# Patient Record
Sex: FEMALE | Race: WHITE | Employment: UNEMPLOYED | URBAN - METROPOLITAN AREA
[De-identification: names, ages, dates, MRNs, and addresses within clinical notes are randomized per-mention and may not be internally consistent; named-entity substitution may affect disease eponyms.]

---

## 2017-09-26 ENCOUNTER — HOSPITAL ENCOUNTER (OUTPATIENT)
Dept: ULTRASOUND IMAGING | Facility: HOSPITAL | Age: 42
Discharge: HOME | End: 2017-09-26
Attending: FAMILY MEDICINE
Payer: COMMERCIAL

## 2017-09-26 DIAGNOSIS — M79.605 PAIN IN BOTH LOWER EXTREMITIES: ICD-10-CM

## 2017-09-26 DIAGNOSIS — M79.604 PAIN IN BOTH LOWER EXTREMITIES: ICD-10-CM

## 2017-09-26 PROCEDURE — 93926 LOWER EXTREMITY STUDY: CPT | Performed by: FAMILY MEDICINE

## 2017-09-26 PROCEDURE — 93971 EXTREMITY STUDY: CPT | Performed by: FAMILY MEDICINE

## 2017-10-12 ENCOUNTER — HOSPITAL ENCOUNTER (OUTPATIENT)
Dept: PHYSICAL THERAPY | Facility: HOSPITAL | Age: 42
Setting detail: THERAPIES SERIES
Discharge: HOME | End: 2017-10-12
Attending: FAMILY MEDICINE
Payer: COMMERCIAL

## 2017-10-12 DIAGNOSIS — M79.604 RIGHT LEG PAIN: ICD-10-CM

## 2017-10-12 PROCEDURE — 97110 THERAPEUTIC EXERCISES: CPT

## 2017-10-12 PROCEDURE — 97162 PT EVAL MOD COMPLEX 30 MIN: CPT

## 2017-10-12 NOTE — PROGRESS NOTES
LOWER EXTREMITY EVALUATION:   Referring Physician: Dr. Dre Marino  Diagnosis: R leg pain     Date of Service: 10/12/2017     PATIENT Abby Hawk is a 38year old y/o female who presents to therapy today with complaints of pain in the R lower leg ADL as prior without pain or compensation.      Precautions:  None  OBJECTIVE:   Observation: slight discoloration of R lower leg and foot (reddish-purple coloration), increased skin temperature of R lower leg/ankle, moderate swelling throughout R foot/ankl for improved ankle control with ADLs such as prolonged gait and stair negotiation (8-10 visits)    · Increased balance/proprioception with patient able to perform SLS on Airex foam >= 20 seconds for increased ankle stability with activities such as gait on

## 2017-10-16 ENCOUNTER — HOSPITAL ENCOUNTER (OUTPATIENT)
Dept: PHYSICAL THERAPY | Facility: HOSPITAL | Age: 42
Setting detail: THERAPIES SERIES
Discharge: HOME | End: 2017-10-16
Attending: FAMILY MEDICINE
Payer: COMMERCIAL

## 2017-10-16 PROCEDURE — 97110 THERAPEUTIC EXERCISES: CPT

## 2017-10-16 PROCEDURE — 97140 MANUAL THERAPY 1/> REGIONS: CPT

## 2017-10-16 NOTE — PROGRESS NOTES
Dx: R leg pain (s/p R ORIF)         Authorized # of Visits:  8         Next MD visit: none scheduled  Fall Risk: standard         Precautions: n/a             Subjective: Patient reports the R ankle is a little more painful today as she was on her feet mor

## 2017-10-19 ENCOUNTER — HOSPITAL ENCOUNTER (OUTPATIENT)
Dept: PHYSICAL THERAPY | Facility: HOSPITAL | Age: 42
Setting detail: THERAPIES SERIES
Discharge: HOME | End: 2017-10-19
Attending: FAMILY MEDICINE
Payer: COMMERCIAL

## 2017-10-19 PROCEDURE — 97140 MANUAL THERAPY 1/> REGIONS: CPT

## 2017-10-19 PROCEDURE — 97110 THERAPEUTIC EXERCISES: CPT

## 2017-10-19 NOTE — PROGRESS NOTES
Dx: R leg pain (s/p R ORIF)         Authorized # of Visits:  8         Next MD visit: none scheduled  Fall Risk: standard         Precautions: n/a             Subjective: Patient reports the R ankle has been feeling better but she did note \"soreness\" fol

## 2017-10-23 ENCOUNTER — HOSPITAL ENCOUNTER (OUTPATIENT)
Dept: PHYSICAL THERAPY | Facility: HOSPITAL | Age: 42
Setting detail: THERAPIES SERIES
Discharge: HOME | End: 2017-10-23
Attending: FAMILY MEDICINE
Payer: COMMERCIAL

## 2017-10-23 PROCEDURE — 97110 THERAPEUTIC EXERCISES: CPT

## 2017-10-23 PROCEDURE — 97140 MANUAL THERAPY 1/> REGIONS: CPT

## 2017-10-23 NOTE — PROGRESS NOTES
Dx: R leg pain (s/p R ORIF)         Authorized # of Visits:  8         Next MD visit: none scheduled  Fall Risk: standard         Precautions: n/a             Subjective: Patient reports the right ankle is a little more swollen because she was on her feet Total Treatment Time: 45 min

## 2017-10-26 ENCOUNTER — HOSPITAL ENCOUNTER (OUTPATIENT)
Dept: PHYSICAL THERAPY | Facility: HOSPITAL | Age: 42
Setting detail: THERAPIES SERIES
Discharge: HOME | End: 2017-10-26
Attending: FAMILY MEDICINE
Payer: COMMERCIAL

## 2017-10-26 PROCEDURE — 97110 THERAPEUTIC EXERCISES: CPT

## 2017-10-26 PROCEDURE — 97140 MANUAL THERAPY 1/> REGIONS: CPT

## 2017-10-26 NOTE — PROGRESS NOTES
Dx: R leg pain (s/p R ORIF)         Authorized # of Visits:  8         Next MD visit: none scheduled  Fall Risk: standard         Precautions: n/a             Subjective: Patient reports ankle continues to improve, noting descending stairs is still a chall Treatment Time: 45 min

## 2017-10-30 ENCOUNTER — HOSPITAL ENCOUNTER (OUTPATIENT)
Dept: PHYSICAL THERAPY | Facility: HOSPITAL | Age: 42
Setting detail: THERAPIES SERIES
Discharge: HOME | End: 2017-10-30
Attending: FAMILY MEDICINE
Payer: COMMERCIAL

## 2017-10-30 PROCEDURE — 97110 THERAPEUTIC EXERCISES: CPT

## 2017-10-30 PROCEDURE — 97140 MANUAL THERAPY 1/> REGIONS: CPT

## 2017-10-30 NOTE — PROGRESS NOTES
Dx: R leg pain (s/p R ORIF)         Authorized # of Visits:  8         Next MD visit: none scheduled  Fall Risk: standard         Precautions: n/a             Subjective: Patient reports she was at a [employer] for over 2 hours yesterday walking around o fsu x10 Bosu fsu x10     A/P tilt board tap x1' A/P tilt board tap x1' Airex SLB 2x30\" Bosu squat x10 Bosu squat x10     Gait training x5' 6\" fsu x15 8\" fwd lunge x15 Level 2 BAPS x5 each cw/ccw Level 2 BAPS x5 each cw/ccw      4\" fsd/rsu x15 Standing

## 2017-11-02 ENCOUNTER — HOSPITAL ENCOUNTER (OUTPATIENT)
Dept: PHYSICAL THERAPY | Facility: HOSPITAL | Age: 42
Setting detail: THERAPIES SERIES
Discharge: HOME | End: 2017-11-02
Attending: FAMILY MEDICINE
Payer: COMMERCIAL

## 2017-11-02 PROCEDURE — 97140 MANUAL THERAPY 1/> REGIONS: CPT

## 2017-11-02 PROCEDURE — 97110 THERAPEUTIC EXERCISES: CPT

## 2017-11-02 NOTE — PROGRESS NOTES
Dx: R leg pain (s/p R ORIF)         Authorized # of Visits:  8         Next MD visit: none scheduled  Fall Risk: standard         Precautions: n/a             Subjective: Patient reports her ankle has been sore but she is still forcing herself to go up and 4\" lateral heel tap x15    Tilt board soleus stretch 2x30\" Tilt board soleus stretch 2x30\" 4\" fsd/rsu w/ heel planted x12 Bosu fsu x10 Bosu fsu x10 Bosu fsu x10    A/P tilt board tap x1' A/P tilt board tap x1' Airex SLB 2x30\" Bosu squat x10 Bosu squat

## 2017-11-06 ENCOUNTER — APPOINTMENT (OUTPATIENT)
Dept: PHYSICAL THERAPY | Facility: HOSPITAL | Age: 42
End: 2017-11-06
Attending: FAMILY MEDICINE
Payer: COMMERCIAL

## 2017-11-09 ENCOUNTER — HOSPITAL ENCOUNTER (OUTPATIENT)
Dept: PHYSICAL THERAPY | Facility: HOSPITAL | Age: 42
Setting detail: THERAPIES SERIES
Discharge: HOME | End: 2017-11-09
Attending: FAMILY MEDICINE
Payer: COMMERCIAL

## 2017-11-09 PROCEDURE — 97140 MANUAL THERAPY 1/> REGIONS: CPT

## 2017-11-09 PROCEDURE — 97110 THERAPEUTIC EXERCISES: CPT

## 2017-11-09 NOTE — PROGRESS NOTES
LOWER EXTREMITY EVALUATION:   Referring Physician:  [id number]  Diagnosis: R leg pain     Date of Service: 11/9/2017     PATIENT Shelia Castellanos reports that the right ankle is steadily improving.   She states she forces herself to stay active, going up 5/5    DF: R 5/5; L 5/5  PF: R 3+/5; L 5/5  INV: R 4+/5; L 5/5  EV: R 5/5; L 5/5     Special tests: unremarkable    Balance: SLS: R 12 sec, L >20 sec    Today’s Treatment and Response:   Formal reassessment.   STM throughout R foot/ankle with focus on calf/ questions, please contact me at Dept: [phone]    Sincerely,  Electronically signed by therapist: Jaquelin Flaherty, PT    [de-identified] certification required: Yes  I certify the need for these services furnished under this plan of treatment and while unde

## 2017-11-13 ENCOUNTER — APPOINTMENT (OUTPATIENT)
Dept: PHYSICAL THERAPY | Facility: HOSPITAL | Age: 42
End: 2017-11-13
Attending: FAMILY MEDICINE
Payer: COMMERCIAL

## 2017-11-16 ENCOUNTER — APPOINTMENT (OUTPATIENT)
Dept: PHYSICAL THERAPY | Facility: HOSPITAL | Age: 42
End: 2017-11-16
Attending: FAMILY MEDICINE
Payer: COMMERCIAL

## 2017-11-20 ENCOUNTER — APPOINTMENT (OUTPATIENT)
Dept: PHYSICAL THERAPY | Facility: HOSPITAL | Age: 42
End: 2017-11-20
Attending: FAMILY MEDICINE
Payer: COMMERCIAL

## 2017-11-27 ENCOUNTER — APPOINTMENT (OUTPATIENT)
Dept: PHYSICAL THERAPY | Facility: HOSPITAL | Age: 42
End: 2017-11-27
Attending: FAMILY MEDICINE
Payer: COMMERCIAL

## 2017-12-04 ENCOUNTER — HOSPITAL ENCOUNTER (OUTPATIENT)
Dept: PHYSICAL THERAPY | Facility: HOSPITAL | Age: 42
Setting detail: THERAPIES SERIES
Discharge: HOME | End: 2017-12-04
Attending: FAMILY MEDICINE
Payer: COMMERCIAL

## 2017-12-04 PROCEDURE — 97140 MANUAL THERAPY 1/> REGIONS: CPT

## 2017-12-04 PROCEDURE — 97110 THERAPEUTIC EXERCISES: CPT

## 2017-12-04 NOTE — PROGRESS NOTES
Dx: R leg pain (s/p R ORIF)         Authorized # of Visits:  6         Next MD visit: none scheduled  Fall Risk: standard         Precautions: n/a             Subjective: Patient reports she has been on her feet a lot trying to pack and get things moved ou ankle PROM all planes R ankle PROM all planes R ankle PROM all planes R ankle PROM all planes R ankle PROM all planes R ankle PROM all planes   Posterior talar glides Posterior talar glides Posterior talar glides Posterior talar glides Posterior talar glid

## 2017-12-07 ENCOUNTER — HOSPITAL ENCOUNTER (OUTPATIENT)
Dept: PHYSICAL THERAPY | Facility: HOSPITAL | Age: 42
Setting detail: THERAPIES SERIES
Discharge: HOME | End: 2017-12-07
Attending: FAMILY MEDICINE
Payer: COMMERCIAL

## 2017-12-07 PROCEDURE — 97140 MANUAL THERAPY 1/> REGIONS: CPT

## 2017-12-07 PROCEDURE — 97110 THERAPEUTIC EXERCISES: CPT

## 2017-12-07 NOTE — PROGRESS NOTES
Dx: R leg pain (s/p R ORIF)         Authorized # of Visits:  6         Next MD visit: none scheduled  Fall Risk: standard         Precautions: n/a             Subjective: Patient reports the ankle has been feeling better since her last treatment session bu x20' total Manual PROM all planes     R ankle PROM all planes R ankle PROM all planes R ankle PROM all planes R ankle PROM all planes R ankle PROM all planes R ankle PROM all planes R ankle PROM all planes Manual posterior talar glides     Posterior talar

## 2017-12-11 ENCOUNTER — HOSPITAL ENCOUNTER (OUTPATIENT)
Dept: PHYSICAL THERAPY | Facility: HOSPITAL | Age: 42
Setting detail: THERAPIES SERIES
Discharge: HOME | End: 2017-12-11
Attending: FAMILY MEDICINE
Payer: COMMERCIAL

## 2017-12-11 PROCEDURE — 97140 MANUAL THERAPY 1/> REGIONS: CPT

## 2017-12-11 PROCEDURE — 97110 THERAPEUTIC EXERCISES: CPT

## 2017-12-11 NOTE — PROGRESS NOTES
Dx: R leg pain (s/p R ORIF)         Authorized # of Visits:  6         Next MD visit: none scheduled  Fall Risk: standard         Precautions: n/a             Subjective: Patient reports the ankle has been doing better in general, noting decreased swelling total Manual PROM all planes Manual PROM all planes    R ankle PROM all planes R ankle PROM all planes R ankle PROM all planes R ankle PROM all planes R ankle PROM all planes R ankle PROM all planes R ankle PROM all planes Manual posterior talar glides Sub

## 2017-12-14 ENCOUNTER — APPOINTMENT (OUTPATIENT)
Dept: PHYSICAL THERAPY | Facility: HOSPITAL | Age: 42
End: 2017-12-14
Attending: FAMILY MEDICINE
Payer: COMMERCIAL

## 2017-12-18 ENCOUNTER — APPOINTMENT (OUTPATIENT)
Dept: PHYSICAL THERAPY | Facility: HOSPITAL | Age: 42
End: 2017-12-18
Attending: FAMILY MEDICINE
Payer: COMMERCIAL

## 2017-12-21 ENCOUNTER — APPOINTMENT (OUTPATIENT)
Dept: PHYSICAL THERAPY | Facility: HOSPITAL | Age: 42
End: 2017-12-21
Attending: FAMILY MEDICINE
Payer: COMMERCIAL

## 2018-01-04 ENCOUNTER — HOSPITAL ENCOUNTER (OUTPATIENT)
Dept: PHYSICAL THERAPY | Facility: HOSPITAL | Age: 43
Setting detail: THERAPIES SERIES
Discharge: HOME | End: 2018-01-04
Attending: FAMILY MEDICINE
Payer: COMMERCIAL

## 2018-01-04 PROCEDURE — 97110 THERAPEUTIC EXERCISES: CPT

## 2018-01-04 PROCEDURE — 97140 MANUAL THERAPY 1/> REGIONS: CPT

## 2018-01-04 NOTE — PROGRESS NOTES
Dx: R leg pain (s/p R ORIF)         Authorized # of Visits:  6         Next MD visit: none scheduled  Fall Risk: standard         Precautions: n/a             Subjective:Patient reports she is still getting discomfort along the lateral lower leg.   Notes sh gastroc/soleus/Cook's tendon x5' STM to R gastroc/soleus/Elizabeth's tendon x5' STM to R gastroc/soleus/Cook's tendon x5' GIASTM to R foot/ankle w/ focus on gastroc/soleus complex and elizabeth's tendon x20' total Manual PROM all planes Manual PROM all p 2x30\" Shuttle 1B 1G SLP x20      Charges: Man, 2TEX   Total Timed Treatment: 45 min     Total Treatment Time: 45 min

## 2018-01-11 ENCOUNTER — HOSPITAL ENCOUNTER (OUTPATIENT)
Dept: PHYSICAL THERAPY | Facility: HOSPITAL | Age: 43
Setting detail: THERAPIES SERIES
Discharge: HOME | End: 2018-01-11
Attending: FAMILY MEDICINE
Payer: COMMERCIAL

## 2018-01-11 PROCEDURE — 97140 MANUAL THERAPY 1/> REGIONS: CPT

## 2018-01-11 PROCEDURE — 97110 THERAPEUTIC EXERCISES: CPT

## 2018-01-11 NOTE — PROGRESS NOTES
Dx: R leg pain (s/p R ORIF)         Authorized # of Visits:  6         Next MD visit: none scheduled  Fall Risk: standard         Precautions: n/a             Subjective: Patient reports the ankle has been a little more achy yesterday and today, attributin gastroc/soleus/[place]'s tendon x5' GIASTM to R foot/ankle w/ focus on gastroc/soleus complex and elizabeth's tendon x20' total Manual PROM all planes Manual PROM all planes Manual PROM all planes Manual PROM all planes    R ankle PROM all planes R ankle PRO 2B SLP x20 Shuttle 3B DLP x20  Shuttle SL jump 1G 1Y x20     SLB 2x30\"   6\" fsd/rsu x10 Fwd lunge cone reach x10  Airex SLB 2x30\" Shuttle 1B 1G SLP x20        Charges: Man, 2TEX   Total Timed Treatment: 45 min     Total Treatment Time: 45 min

## 2018-01-18 ENCOUNTER — HOSPITAL ENCOUNTER (OUTPATIENT)
Dept: PHYSICAL THERAPY | Facility: HOSPITAL | Age: 43
Setting detail: THERAPIES SERIES
Discharge: HOME | End: 2018-01-18
Attending: FAMILY MEDICINE
Payer: COMMERCIAL

## 2018-01-18 PROCEDURE — 97110 THERAPEUTIC EXERCISES: CPT

## 2018-01-18 PROCEDURE — 97140 MANUAL THERAPY 1/> REGIONS: CPT

## 2018-01-22 NOTE — PROGRESS NOTES
LOWER EXTREMITY EVALUATION:   Referring Physician: Dr. Mejia Yang  Diagnosis: R leg pain     Date of Service: 1/18/2017     PATIENT SUMMARY   Carlos Barrett has attended 14 PT sessions.  She reports that she still experiences occasional \"aching\" al sensation over the top of her foot    AROM:   Hip Knee Foot/Ankle   WNL WNL    DF: R12; L 12  PF: R 45; L 40  INV: R 45; L 40  EV: R 12; L 20     Accessory motion: improved posterior talar glide on R, improved subtalar jt mobility on R    Flexibility:  Hip agreed to actively participate in planning and for this course of care. Thank you for your referral. Please co-sign or sign and return this letter via fax as soon as possible to [phone].  If you have any questions, please contact me at Dept: [phone]

## 2018-02-21 ENCOUNTER — HOSPITAL ENCOUNTER (OUTPATIENT)
Age: 43
Discharge: HOME | End: 2018-02-21
Attending: FAMILY MEDICINE
Payer: COMMERCIAL

## 2018-02-21 ENCOUNTER — APPOINTMENT (OUTPATIENT)
Dept: GENERAL RADIOLOGY | Age: 43
End: 2018-02-21
Attending: FAMILY MEDICINE
Payer: COMMERCIAL

## 2018-02-21 VITALS
BODY MASS INDEX: 43.39 KG/M2 | OXYGEN SATURATION: 100 % | TEMPERATURE: 98 F | SYSTOLIC BLOOD PRESSURE: 131 MMHG | HEIGHT: 66 IN | WEIGHT: 270 LBS | DIASTOLIC BLOOD PRESSURE: 66 MMHG | RESPIRATION RATE: 18 BRPM | HEART RATE: 88 BPM

## 2018-02-21 DIAGNOSIS — S93.401A MODERATE RIGHT ANKLE SPRAIN, INITIAL ENCOUNTER: ICD-10-CM

## 2018-02-21 DIAGNOSIS — S82.54XA CLOSED NONDISPLACED FRACTURE OF MEDIAL MALLEOLUS OF RIGHT TIBIA, INITIAL ENCOUNTER: Primary | ICD-10-CM

## 2018-02-21 PROBLEM — S82.51XA CLOSED AVULSION FRACTURE OF MEDIAL MALLEOLUS OF RIGHT TIBIA, INITIAL ENCOUNTER: Status: ACTIVE | Noted: 2018-02-21

## 2018-02-21 PROCEDURE — 29515 APPLICATION SHORT LEG SPLINT: CPT

## 2018-02-21 PROCEDURE — 73610 X-RAY EXAM OF ANKLE: CPT | Performed by: FAMILY MEDICINE

## 2018-02-21 PROCEDURE — 99214 OFFICE O/P EST MOD 30 MIN: CPT

## 2018-02-21 PROCEDURE — 99203 OFFICE O/P NEW LOW 30 MIN: CPT

## 2018-02-21 RX ORDER — SUMATRIPTAN 50 MG/1
50 TABLET, FILM COATED ORAL EVERY 2 HOUR PRN
COMMUNITY
End: 2020-01-30 | Stop reason: DRUGHIGH

## 2018-02-21 RX ORDER — CYCLOBENZAPRINE HCL 10 MG
10 TABLET ORAL 3 TIMES DAILY PRN
COMMUNITY
End: 2020-01-30

## 2018-02-21 RX ORDER — ACETAMINOPHEN AND CODEINE PHOSPHATE 300; 30 MG/1; MG/1
1 TABLET ORAL EVERY 6 HOURS PRN
Qty: 20 TABLET | Refills: 0 | Status: SHIPPED | OUTPATIENT
Start: 2018-02-21 | End: 2018-02-28

## 2018-02-21 RX ORDER — ACETAMINOPHEN AND CODEINE PHOSPHATE 120; 12 MG/5ML; MG/5ML
12.5 SOLUTION ORAL ONCE
Status: COMPLETED | OUTPATIENT
Start: 2018-02-21 | End: 2018-02-21

## 2018-02-21 RX ORDER — TOPIRAMATE 50 MG/1
75 TABLET, FILM COATED ORAL DAILY
COMMUNITY
End: 2018-05-17 | Stop reason: ALTCHOICE

## 2018-02-21 NOTE — ED NOTES
Short leg splint applied to the right leg. Pt tolerated well. Pt's toes are pink and she denies numbness to her foot and toes. Pt already has crutches and she also has a scooter at home.  Pt has an ortho appointment set up today at 315pm.  Pt will be disch

## 2018-02-21 NOTE — ED PROVIDER NOTES
Patient Seen in: [facility]    History   Patient presents with:  Lower Extremity Injury (musculoskeletal)    Stated Complaint: right ankle injury     HPI    53-year-old female presented with chief complaint of right ankle i Examination of the right ankle there is diffuse soft tissue swelling appreciated over her medial, lateral malleoli, top of the ankle. There is diffuse tenderness bilateral malleoli, anterior talofibular ligament area. No foot tenderness.   Normal dorsalis PROCEDURE:  XR ANKLE (MIN 3 VIEWS) RIGHT (CPT=73610)  TECHNIQUE:  Three views were obtained. COMPARISON:  External Exams, XR ANKLE (2 VIEW), RIGHT (CPT=73600), 6/12/2017, 9:54. External Exams, XR ANKLE (2 VIEW), RIGHT (CPT=73600), 7/28/2017, 14:57.   FREDDIE Patient with the right ankle injury status post fall. X-ray of the ankle shows worsening tissue swelling, stable hardware, new triangular ossicle beneath the medial malleoli reflecting a fracture.      Case discussed with Dr. Lee Scanlon, recommended short leg

## 2018-02-21 NOTE — ED INITIAL ASSESSMENT (HPI)
Pt states that she slipped on ice this morning and twisted her right ankle. Pt states that she just had surgery one year ago for a ankle/leg fracture and has pin and screws in her right ankle. Noted swelling to the right ankle. Pt is able to move her toes.

## 2018-03-29 ENCOUNTER — HOSPITAL ENCOUNTER (OUTPATIENT)
Dept: CT IMAGING | Facility: HOSPITAL | Age: 43
Discharge: HOME | End: 2018-03-29
Attending: ORTHOPAEDIC SURGERY
Payer: COMMERCIAL

## 2018-03-29 DIAGNOSIS — S82.51XA CLOSED AVULSION FRACTURE OF MEDIAL MALLEOLUS OF RIGHT TIBIA, INITIAL ENCOUNTER: ICD-10-CM

## 2018-03-29 PROCEDURE — 76377 3D RENDER W/INTRP POSTPROCES: CPT | Performed by: ORTHOPAEDIC SURGERY

## 2018-03-29 PROCEDURE — 73700 CT LOWER EXTREMITY W/O DYE: CPT | Performed by: ORTHOPAEDIC SURGERY

## 2018-04-23 PROBLEM — S82.831A CLOSED FRACTURE OF DISTAL END OF RIGHT FIBULA, UNSPECIFIED FRACTURE MORPHOLOGY, INITIAL ENCOUNTER: Status: ACTIVE | Noted: 2018-04-23

## 2018-07-02 PROBLEM — Z87.81 S/P ORIF (OPEN REDUCTION INTERNAL FIXATION) FRACTURE: Status: ACTIVE | Noted: 2018-07-02

## 2018-07-02 PROBLEM — Z98.890 S/P ORIF (OPEN REDUCTION INTERNAL FIXATION) FRACTURE: Status: ACTIVE | Noted: 2018-07-02

## 2018-08-06 ENCOUNTER — HOSPITAL ENCOUNTER (OUTPATIENT)
Dept: PHYSICAL THERAPY | Facility: HOSPITAL | Age: 43
Setting detail: THERAPIES SERIES
Discharge: HOME | End: 2018-08-06
Attending: PHYSICIAN ASSISTANT
Payer: COMMERCIAL

## 2018-08-06 PROCEDURE — 97110 THERAPEUTIC EXERCISES: CPT

## 2018-08-06 PROCEDURE — 97162 PT EVAL MOD COMPLEX 30 MIN: CPT

## 2018-08-06 NOTE — PROGRESS NOTES
LOWER EXTREMITY EVALUATION:   Referring Physician: Dr. Joana Corral  Diagnosis:  Closed fracture of distal end of right fibula, unspecified fracture morphology, initial encounter (A74.967D)     Date of Service: 8/6/2018     PATIENT SUMMARY   Sandee Mendez is consistent with s/p R ankle ORIF. Current impairments including the following: pain, swelling, decreased ROM, decreased strength, decreased balance, and impaired gait.  Patient will benefit from physical therapy to address the above impairments and promote stairs without compensation (8 visits)    · Increased strength throughout ankle musculature to 5/5 for improved ankle control with ADLs such as prolonged gait and stair negotiation (8 visits)    · Increased balance/proprioception with patient able to perfo

## 2018-08-13 ENCOUNTER — HOSPITAL ENCOUNTER (OUTPATIENT)
Dept: PHYSICAL THERAPY | Facility: HOSPITAL | Age: 43
Setting detail: THERAPIES SERIES
Discharge: HOME | End: 2018-08-13
Attending: PHYSICIAN ASSISTANT
Payer: COMMERCIAL

## 2018-08-13 PROCEDURE — 97140 MANUAL THERAPY 1/> REGIONS: CPT

## 2018-08-13 PROCEDURE — 97110 THERAPEUTIC EXERCISES: CPT

## 2018-08-13 NOTE — PROGRESS NOTES
Dx: Closed fracture of distal end of right fibula, unspecified fracture morphology, initial encounter (J80.915N)         Authorized # of Visits:  8         Next MD visit: none scheduled  Fall Risk: standard         Precautions: n/a             Subjective:

## 2018-08-16 ENCOUNTER — APPOINTMENT (OUTPATIENT)
Dept: PHYSICAL THERAPY | Facility: HOSPITAL | Age: 43
End: 2018-08-16
Attending: PHYSICIAN ASSISTANT
Payer: COMMERCIAL

## 2018-08-20 ENCOUNTER — HOSPITAL ENCOUNTER (OUTPATIENT)
Dept: PHYSICAL THERAPY | Facility: HOSPITAL | Age: 43
Setting detail: THERAPIES SERIES
Discharge: HOME | End: 2018-08-20
Attending: PHYSICIAN ASSISTANT
Payer: COMMERCIAL

## 2018-08-20 PROCEDURE — 97110 THERAPEUTIC EXERCISES: CPT

## 2018-08-20 PROCEDURE — 97140 MANUAL THERAPY 1/> REGIONS: CPT

## 2018-08-20 NOTE — PROGRESS NOTES
Dx: Closed fracture of distal end of right fibula, unspecified fracture morphology, initial encounter (S52.631G)         Authorized # of Visits:  8         Next MD visit: none scheduled  Fall Risk: standard         Precautions: n/a             Subjective:

## 2018-08-23 ENCOUNTER — APPOINTMENT (OUTPATIENT)
Dept: PHYSICAL THERAPY | Facility: HOSPITAL | Age: 43
End: 2018-08-23
Attending: PHYSICIAN ASSISTANT
Payer: COMMERCIAL

## 2018-08-27 ENCOUNTER — HOSPITAL ENCOUNTER (OUTPATIENT)
Dept: PHYSICAL THERAPY | Facility: HOSPITAL | Age: 43
Setting detail: THERAPIES SERIES
Discharge: HOME | End: 2018-08-27
Attending: PHYSICIAN ASSISTANT
Payer: COMMERCIAL

## 2018-08-27 PROCEDURE — 97110 THERAPEUTIC EXERCISES: CPT

## 2018-08-27 PROCEDURE — 97140 MANUAL THERAPY 1/> REGIONS: CPT

## 2018-08-27 NOTE — PROGRESS NOTES
Dx: Closed fracture of distal end of right fibula, unspecified fracture morphology, initial encounter (G98.461P)         Authorized # of Visits:  8         Next MD visit: none scheduled  Fall Risk: standard         Precautions: n/a             Subjective: w/ heel planted x10 Bosu squat x15 6\" lateral heel tap 2x10       Shuttle SLP x20 Shuttle SLP x20 Shuttle SLP x20        Airex SLB 2x30\" trial Lateral lunge x15                           Charges: Man, 2 NAJMA  Total Timed Treatment: 42 min     Total Treatm

## 2018-09-04 ENCOUNTER — HOSPITAL ENCOUNTER (OUTPATIENT)
Dept: PHYSICAL THERAPY | Facility: HOSPITAL | Age: 43
Setting detail: THERAPIES SERIES
Discharge: HOME | End: 2018-09-04
Attending: PHYSICIAN ASSISTANT
Payer: COMMERCIAL

## 2018-09-04 PROCEDURE — 97110 THERAPEUTIC EXERCISES: CPT

## 2018-09-04 PROCEDURE — 97140 MANUAL THERAPY 1/> REGIONS: CPT

## 2018-09-04 NOTE — PROGRESS NOTES
PROGRESS NOTE:   Referring Physician: Dr. Sofi Spence  Diagnosis:  Closed fracture of distal end of right fibula, unspecified fracture morphology, initial encounter (M97.450X)     Date of Service: 9/4/2018     PATIENT SUMMARY   Nikhiljovan Juan reports the ankle seems toe-off  Palpation: tenderness throughout posterior ankle surrounding the elizabeth's tendon  Sensation: unremarkable    Swelling:  Bimalleolar: R 26.7cm (was 27cm) L 24cm  Figure 8: R 54.5cm (was 57.7cm) L 54cm    AROM:   Hip Knee Foot/Ankle   WNL WNL    DF physical therapy at this time. She will be following up with MD this Thursday and anticipate 30-day period of trial with HEP alone prior to formal DC from PT.     Education or treatment limitation: None  Rehab Potential:good    Patient/Family/Caregiver was

## 2018-09-06 ENCOUNTER — APPOINTMENT (OUTPATIENT)
Dept: PHYSICAL THERAPY | Facility: HOSPITAL | Age: 43
End: 2018-09-06
Attending: PHYSICIAN ASSISTANT
Payer: COMMERCIAL

## 2018-09-06 ENCOUNTER — TELEPHONE (OUTPATIENT)
Dept: PHYSICAL THERAPY | Facility: HOSPITAL | Age: 43
End: 2018-09-06

## 2019-08-19 ENCOUNTER — APPOINTMENT (OUTPATIENT)
Dept: GENERAL RADIOLOGY | Age: 44
End: 2019-08-19
Attending: FAMILY MEDICINE
Payer: MEDICAID

## 2019-08-19 ENCOUNTER — HOSPITAL ENCOUNTER (OUTPATIENT)
Age: 44
Discharge: HOME | End: 2019-08-19
Attending: FAMILY MEDICINE
Payer: MEDICAID

## 2019-08-19 VITALS
WEIGHT: 270 LBS | SYSTOLIC BLOOD PRESSURE: 149 MMHG | TEMPERATURE: 98 F | DIASTOLIC BLOOD PRESSURE: 90 MMHG | OXYGEN SATURATION: 97 % | BODY MASS INDEX: 44 KG/M2 | HEART RATE: 105 BPM | RESPIRATION RATE: 16 BRPM

## 2019-08-19 DIAGNOSIS — J18.9 COMMUNITY ACQUIRED PNEUMONIA OF RIGHT UPPER LOBE OF LUNG: Primary | ICD-10-CM

## 2019-08-19 PROCEDURE — 99214 OFFICE O/P EST MOD 30 MIN: CPT

## 2019-08-19 PROCEDURE — 71046 X-RAY EXAM CHEST 2 VIEWS: CPT | Performed by: FAMILY MEDICINE

## 2019-08-19 PROCEDURE — 99213 OFFICE O/P EST LOW 20 MIN: CPT

## 2019-08-19 RX ORDER — LEVOFLOXACIN 750 MG/1
750 TABLET ORAL DAILY
Qty: 7 TABLET | Refills: 0 | Status: SHIPPED | OUTPATIENT
Start: 2019-08-19 | End: 2019-08-26

## 2019-08-19 NOTE — ED PROVIDER NOTES
Patient Seen in: [address] [facility]    History   Patient presents with:  Cough/URI  Body ache and/or chills    Stated Complaint: TL-URI    HPI    70-year-old female presents with complaints of worsening cough for the past 1 week.   Symptoms s is alert oriented ×3 in no acute distress   conjunctiva clear no icterus  Oropharynx : Postnasal drip appreciated. .  Neck supple full range of motion, no cervical lymphadenopathy  Lungs clear to auscultation bilaterally  Heart S1-S2 heard no murmurs no gal for a visit in 1 week          Medications Prescribed:  Discharge Medication List as of 8/19/2019 10:36 AM    START taking these medications    levofloxacin (LEVAQUIN) 750 MG Oral Tab  Take 1 tablet (750 mg total) by mouth daily for 7 days. , Normal, Disp-7

## 2019-08-19 NOTE — ED INITIAL ASSESSMENT (HPI)
Cough and congestion in chest for one week. Had tactile fever last night and had chills. Difficulty sleeping with cough all night. Cough is productive.  Not really any nasal congestion or drainage, patient states she feels it mostly in her chest.

## 2019-09-04 ENCOUNTER — APPOINTMENT (OUTPATIENT)
Dept: GENERAL RADIOLOGY | Age: 44
End: 2019-09-04
Attending: FAMILY MEDICINE
Payer: MEDICAID

## 2019-09-04 ENCOUNTER — HOSPITAL ENCOUNTER (OUTPATIENT)
Age: 44
Discharge: HOME | End: 2019-09-04
Attending: FAMILY MEDICINE
Payer: MEDICAID

## 2019-09-04 VITALS
RESPIRATION RATE: 20 BRPM | DIASTOLIC BLOOD PRESSURE: 84 MMHG | HEART RATE: 70 BPM | TEMPERATURE: 99 F | OXYGEN SATURATION: 98 % | SYSTOLIC BLOOD PRESSURE: 128 MMHG

## 2019-09-04 DIAGNOSIS — R07.9 RIGHT-SIDED CHEST PAIN: ICD-10-CM

## 2019-09-04 DIAGNOSIS — R05.9 COUGH: Primary | ICD-10-CM

## 2019-09-04 PROCEDURE — 99214 OFFICE O/P EST MOD 30 MIN: CPT

## 2019-09-04 PROCEDURE — 99213 OFFICE O/P EST LOW 20 MIN: CPT

## 2019-09-04 PROCEDURE — 71046 X-RAY EXAM CHEST 2 VIEWS: CPT | Performed by: FAMILY MEDICINE

## 2019-09-04 RX ORDER — PREDNISONE 20 MG/1
40 TABLET ORAL DAILY
Qty: 10 TABLET | Refills: 0 | Status: SHIPPED | OUTPATIENT
Start: 2019-09-04 | End: 2019-09-09

## 2019-09-04 RX ORDER — ALBUTEROL SULFATE 90 UG/1
2 AEROSOL, METERED RESPIRATORY (INHALATION) EVERY 6 HOURS PRN
Qty: 1 INHALER | Refills: 0 | Status: SHIPPED | OUTPATIENT
Start: 2019-09-04 | End: 2019-09-16

## 2019-09-04 RX ORDER — BENZONATATE 200 MG/1
200 CAPSULE ORAL 3 TIMES DAILY PRN
Qty: 15 CAPSULE | Refills: 0 | Status: SHIPPED | OUTPATIENT
Start: 2019-09-04 | End: 2019-09-16

## 2019-09-04 RX ORDER — DOXYCYCLINE 100 MG/1
100 CAPSULE ORAL 2 TIMES DAILY
Qty: 14 CAPSULE | Refills: 0 | Status: SHIPPED | OUTPATIENT
Start: 2019-09-04 | End: 2019-09-11

## 2019-09-04 NOTE — ED PROVIDER NOTES
Patient Seen in: [address] [facility]    History   Patient presents with:  Cough    Stated Complaint: coughing and lungs hurt    HPI  60-year-old female presents to the immediate care today but with a chief complaints of return of cough over t appears stated age and cooperative  Head: Normocephalic, without obvious abnormality, atraumatic  Eyes: conjunctivae/corneas clear. PERRL, EOM's intact. Fundi benign. Ears: normal TM's and external ear canals both ears  Nose: Nares normal. Septum midline. (cpt=71046)    Result Date: 8/19/2019  PROCEDURE:  XR CHEST PA + LAT CHEST (CPT=71046)  INDICATIONS:  TL-URI  COMPARISON:  None. TECHNIQUE:  PA and lateral chest radiographs were obtained.   PATIENT STATED HISTORY: (As transcribed by Technologist)  Patient Oral Cap  Take 1 capsule (100 mg total) by mouth 2 (two) times daily for 14 doses. , Normal, Disp-14 capsule, R-0    Albuterol Sulfate  (90 Base) MCG/ACT Inhalation Aero Soln  Inhale 2 puffs into the lungs every 6 (six) hours as needed for Wheezing. ,

## 2019-09-04 NOTE — ED INITIAL ASSESSMENT (HPI)
Cough started again 2 days ago, no fevers, pain radiates to right upper chest and pain radiates around and under right arm, she had pneumonia 2 weeks ago.

## 2019-09-09 ENCOUNTER — OFFICE VISIT (OUTPATIENT)
Dept: FAMILY MEDICINE CLINIC | Facility: CLINIC | Age: 44
End: 2019-09-09
Payer: MEDICAID

## 2019-09-09 VITALS
DIASTOLIC BLOOD PRESSURE: 88 MMHG | OXYGEN SATURATION: 98 % | TEMPERATURE: 98 F | HEIGHT: 66.14 IN | RESPIRATION RATE: 18 BRPM | SYSTOLIC BLOOD PRESSURE: 124 MMHG | BODY MASS INDEX: 45.16 KG/M2 | HEART RATE: 75 BPM | WEIGHT: 281 LBS

## 2019-09-09 DIAGNOSIS — J20.9 BRONCHITIS WITH BRONCHOSPASM: ICD-10-CM

## 2019-09-09 DIAGNOSIS — Z87.01 HX OF BACTERIAL PNEUMONIA: ICD-10-CM

## 2019-09-09 DIAGNOSIS — R05.9 COUGH: Primary | ICD-10-CM

## 2019-09-09 PROCEDURE — 99204 OFFICE O/P NEW MOD 45 MIN: CPT | Performed by: FAMILY MEDICINE

## 2019-09-09 PROCEDURE — 94640 AIRWAY INHALATION TREATMENT: CPT | Performed by: FAMILY MEDICINE

## 2019-09-09 RX ORDER — AZITHROMYCIN 250 MG/1
TABLET, FILM COATED ORAL
Qty: 6 TABLET | Refills: 0 | Status: SHIPPED | OUTPATIENT
Start: 2019-09-09 | End: 2019-09-16

## 2019-09-09 RX ORDER — ALBUTEROL SULFATE 2.5 MG/3ML
2.5 SOLUTION RESPIRATORY (INHALATION) ONCE
Status: COMPLETED | OUTPATIENT
Start: 2019-09-09 | End: 2019-09-09

## 2019-09-09 RX ADMIN — ALBUTEROL SULFATE 2.5 MG: 2.5 SOLUTION RESPIRATORY (INHALATION) at 11:13:00

## 2019-09-09 NOTE — PROGRESS NOTES
Patient presents with:  New Patient: Goble Nageotte F/U       HPI:    Patient ID: Camden Wetzel is a 45year old female. Cough unchanged since IC visit on 9/4. Cough is congested but rarely productive. Coughing fits throughout the day.   Did not start doxycy mouth 2 (two) times daily for 14 doses. Disp: 14 capsule Rfl: 0   benzonatate 200 MG Oral Cap Take 1 capsule (200 mg total) by mouth 3 (three) times daily as needed for cough. Disp: 15 capsule Rfl: 0   ibuprofen 200 MG Oral Tab Take 200 mg by mouth.  Disp: diagnosis)  Hx of bacterial pneumonia  Bronchitis with bronchospasm  At risk with recent hx of pneumonia. Start zpak. D/C doxy  Complete prednisone  Use albuterol Q4 hours for 48 hours then ween as tolerated  Advised on supportive measures.  Will get CXR if

## 2019-09-12 ENCOUNTER — TELEPHONE (OUTPATIENT)
Dept: FAMILY MEDICINE CLINIC | Facility: CLINIC | Age: 44
End: 2019-09-12

## 2019-09-12 ENCOUNTER — APPOINTMENT (OUTPATIENT)
Dept: GENERAL RADIOLOGY | Age: 44
End: 2019-09-12
Attending: FAMILY MEDICINE
Payer: MEDICAID

## 2019-09-12 ENCOUNTER — APPOINTMENT (OUTPATIENT)
Dept: CT IMAGING | Age: 44
End: 2019-09-12
Attending: FAMILY MEDICINE
Payer: MEDICAID

## 2019-09-12 ENCOUNTER — HOSPITAL ENCOUNTER (OUTPATIENT)
Age: 44
Discharge: HOME | End: 2019-09-12
Attending: FAMILY MEDICINE
Payer: MEDICAID

## 2019-09-12 VITALS
TEMPERATURE: 99 F | RESPIRATION RATE: 16 BRPM | HEART RATE: 81 BPM | DIASTOLIC BLOOD PRESSURE: 80 MMHG | OXYGEN SATURATION: 99 % | SYSTOLIC BLOOD PRESSURE: 120 MMHG

## 2019-09-12 DIAGNOSIS — R05.9 COUGH: Primary | ICD-10-CM

## 2019-09-12 DIAGNOSIS — R07.89 CHEST TIGHTNESS: ICD-10-CM

## 2019-09-12 DIAGNOSIS — R06.02 SHORTNESS OF BREATH: ICD-10-CM

## 2019-09-12 LAB
#MXD IC: 0.6 X10ˆ3/UL (ref 0.1–1)
CREAT BLD-MCNC: 0.8 MG/DL (ref 0.55–1.02)
DDIMER WHOLE BLOOD: <100 NG/ML DDU (ref ?–400)
GLUCOSE BLD-MCNC: 104 MG/DL (ref 70–99)
HCT VFR BLD AUTO: 42.6 % (ref 35–48)
HGB BLD-MCNC: 13.9 G/DL (ref 12–16)
ISTAT BUN: 16 MG/DL (ref 8–20)
ISTAT CHLORIDE: 104 MMOL/L (ref 101–111)
ISTAT HEMATOCRIT: 42 % (ref 34–50)
ISTAT IONIZED CALCIUM FOR CHEM 8: 1.19 MMOL/L (ref 1.12–1.32)
ISTAT POTASSIUM: 4.4 MMOL/L (ref 3.6–5.1)
ISTAT SODIUM: 138 MMOL/L (ref 136–145)
ISTAT TROPONIN: <0.1 NG/ML (ref ?–0.1)
LYMPHOCYTES # BLD AUTO: 1.3 X10ˆ3/UL (ref 1–4)
LYMPHOCYTES NFR BLD AUTO: 11.3 %
MCH RBC QN AUTO: 28.7 PG (ref 26–34)
MCHC RBC AUTO-ENTMCNC: 32.6 G/DL (ref 31–37)
MCV RBC AUTO: 87.8 FL (ref 80–100)
MIXED CELL %: 4.7 %
NEUTROPHILS # BLD AUTO: 10 X10ˆ3/UL (ref 1.5–7.7)
NEUTROPHILS NFR BLD AUTO: 84 %
PLATELET # BLD AUTO: 454 X10ˆ3/UL (ref 150–450)
RBC # BLD AUTO: 4.85 X10ˆ6/UL (ref 3.8–5.3)
WBC # BLD AUTO: 11.9 X10ˆ3/UL (ref 4–11)

## 2019-09-12 PROCEDURE — 93010 ELECTROCARDIOGRAM REPORT: CPT

## 2019-09-12 PROCEDURE — 85025 COMPLETE CBC W/AUTO DIFF WBC: CPT | Performed by: FAMILY MEDICINE

## 2019-09-12 PROCEDURE — 71260 CT THORAX DX C+: CPT | Performed by: FAMILY MEDICINE

## 2019-09-12 PROCEDURE — 99215 OFFICE O/P EST HI 40 MIN: CPT

## 2019-09-12 PROCEDURE — 84484 ASSAY OF TROPONIN QUANT: CPT

## 2019-09-12 PROCEDURE — 36415 COLL VENOUS BLD VENIPUNCTURE: CPT

## 2019-09-12 PROCEDURE — 71046 X-RAY EXAM CHEST 2 VIEWS: CPT | Performed by: FAMILY MEDICINE

## 2019-09-12 PROCEDURE — 85378 FIBRIN DEGRADE SEMIQUANT: CPT | Performed by: FAMILY MEDICINE

## 2019-09-12 PROCEDURE — 93005 ELECTROCARDIOGRAM TRACING: CPT

## 2019-09-12 PROCEDURE — 80047 BASIC METABLC PNL IONIZED CA: CPT

## 2019-09-12 PROCEDURE — 94640 AIRWAY INHALATION TREATMENT: CPT

## 2019-09-12 RX ORDER — BENZONATATE 200 MG/1
200 CAPSULE ORAL 3 TIMES DAILY PRN
Qty: 15 CAPSULE | Refills: 0 | Status: SHIPPED | OUTPATIENT
Start: 2019-09-12 | End: 2019-09-16

## 2019-09-12 RX ORDER — IPRATROPIUM BROMIDE AND ALBUTEROL SULFATE 2.5; .5 MG/3ML; MG/3ML
3 SOLUTION RESPIRATORY (INHALATION) ONCE
Status: COMPLETED | OUTPATIENT
Start: 2019-09-12 | End: 2019-09-12

## 2019-09-12 RX ORDER — ALBUTEROL SULFATE 2.5 MG/3ML
2.5 SOLUTION RESPIRATORY (INHALATION) EVERY 4 HOURS PRN
Qty: 30 AMPULE | Refills: 0 | Status: SHIPPED | OUTPATIENT
Start: 2019-09-12 | End: 2019-10-09

## 2019-09-12 RX ORDER — PREDNISONE 10 MG/1
TABLET ORAL
Qty: 20 TABLET | Refills: 0 | Status: SHIPPED | OUTPATIENT
Start: 2019-09-12 | End: 2020-01-30 | Stop reason: ALTCHOICE

## 2019-09-12 NOTE — ED NOTES
Pt sts took 2 of her own Advil at approx 2:30pm today for a HA. Sts HA has improved. Sts does not feel SOB since has been resting.

## 2019-09-12 NOTE — TELEPHONE ENCOUNTER
Patient seen 9/9/19 for penumonia  Patient states it hurts to breathe in   Patient was feeling better but is now worsening  Patient has been taking a zpak, prednisone, albuterol inhaler  Cough - at times productive. No fever.   Patient has constant headach

## 2019-09-12 NOTE — ED INITIAL ASSESSMENT (HPI)
Pt sts last night began feeling like \"an elephant is sitting on my chest\" and SOB. Dx with bronchitis by PMD earlier in the week and is taking antibiotics.

## 2019-09-12 NOTE — ED PROVIDER NOTES
Patient Seen in: [address] [facility]    History   Patient presents with:  Chest Pain  Dyspnea MANOLO SOB (respiratory)    Stated Complaint: CHEST HURTS/SOB    HPI    **17-year-old female presents to the immediate care today for the third t Smoking status: Never Smoker      Smokeless tobacco: Never Used    Alcohol use: Yes      Frequency: Monthly or less    Drug use: Never             Review of Systems    Positive for stated complaint: CHEST HURTS/SOB  Other systems are as noted in HPI.   Cons components within normal limits   D-DIMER (POC) - Normal   ISTAT TROPONIN - Normal     EKG    Rate, intervals and axes as noted on EKG Report. Rate: 85  Rhythm: Sinus Rhythm  Reading: Normal sinus rhythm. Possible left atrial enlargement.   Septal infarct Rufus Hatchet, MD            Ct Chest(contrast Only) (cpt=71260)    Result Date: 9/12/2019  PROCEDURE:  CT CHEST(CONTRAST ONLY) (CPT=71260)  COMPARISON:  [address], XR CHEST PA + LAT CHEST (CPT=71046), 9/12/2019, 13:48.   INDICATIONS: 450.  Expected peak flow rates between 460-480    DC home on a course of prednisone taper, recommend albuterol nebulizations, vials of albuterol nebulization given, Lula Botello  Recommend that she follows up with pulmonology.   Likely needs a spirometry

## 2019-09-12 NOTE — TELEPHONE ENCOUNTER
Pt left a vm was seen last Monday 9/9 for Pneumonia,  Pt was put on ZPac and Prednisone,  Pt states she was feeling better, but now her chest and lungs hurt, pt wants to know if she needs to give medication time to work, or should she go back to IC? , or m

## 2019-09-13 LAB
ATRIAL RATE: 85 BPM
P AXIS: 30 DEGREES
P-R INTERVAL: 150 MS
Q-T INTERVAL: 356 MS
QRS DURATION: 88 MS
QTC CALCULATION (BEZET): 423 MS
R AXIS: -3 DEGREES
T AXIS: 45 DEGREES
VENTRICULAR RATE: 85 BPM

## 2019-09-16 ENCOUNTER — OFFICE VISIT (OUTPATIENT)
Dept: FAMILY MEDICINE CLINIC | Facility: CLINIC | Age: 44
End: 2019-09-16
Payer: MEDICAID

## 2019-09-16 VITALS
SYSTOLIC BLOOD PRESSURE: 128 MMHG | WEIGHT: 281 LBS | TEMPERATURE: 98 F | BODY MASS INDEX: 53.05 KG/M2 | RESPIRATION RATE: 16 BRPM | HEART RATE: 93 BPM | HEIGHT: 61 IN | DIASTOLIC BLOOD PRESSURE: 86 MMHG | OXYGEN SATURATION: 99 %

## 2019-09-16 DIAGNOSIS — R05.9 COUGH: Primary | ICD-10-CM

## 2019-09-16 DIAGNOSIS — R53.83 OTHER FATIGUE: ICD-10-CM

## 2019-09-16 DIAGNOSIS — J20.9 BRONCHITIS WITH BRONCHOSPASM: ICD-10-CM

## 2019-09-16 LAB — CONTROL LINE PRESENT WITH A CLEAR BACKGROUND (YES/NO): YES YES/NO

## 2019-09-16 PROCEDURE — 99214 OFFICE O/P EST MOD 30 MIN: CPT | Performed by: FAMILY MEDICINE

## 2019-09-16 PROCEDURE — 86308 HETEROPHILE ANTIBODY SCREEN: CPT | Performed by: FAMILY MEDICINE

## 2019-09-16 RX ORDER — ALBUTEROL SULFATE 90 UG/1
2 AEROSOL, METERED RESPIRATORY (INHALATION) EVERY 6 HOURS PRN
Qty: 1 INHALER | Refills: 0 | Status: SHIPPED | OUTPATIENT
Start: 2019-09-16 | End: 2019-10-07

## 2019-09-16 NOTE — PROGRESS NOTES
Patient presents with: Follow - Up: IC       HPI:    Patient ID: Judy Hardwick is a 45year old female. Seen in IC again on 9/12 with persistent cough. Completed zpak. On repeat prednisone course. Using Alb every 4 hours  Cough 40% better.   Chest t Cyclobenzaprine HCl 10 MG Oral Tab Take 10 mg by mouth 3 (three) times daily as needed for Muscle spasms.  Disp:  Rfl:      Allergies:  Bactrim [Sulfametho*    SWELLING, HIVES    Comment:Itching, hives, hallucinations, chest pain             hives  Norco expresses understanding.   F/u with pulm if not better in next few weeks    Orders Placed This Encounter      Rapid Mono test      Meds This Visit:  Requested Prescriptions     Signed Prescriptions Disp Refills   • Fluticasone Propionate HFA (FLOVENT HFA) 4

## 2019-10-07 RX ORDER — ALBUTEROL SULFATE 90 UG/1
2 AEROSOL, METERED RESPIRATORY (INHALATION) EVERY 6 HOURS PRN
Qty: 1 INHALER | Refills: 0 | Status: SHIPPED | OUTPATIENT
Start: 2019-10-07 | End: 2019-11-06

## 2019-10-07 NOTE — TELEPHONE ENCOUNTER
LOV 9/16/19 for cough, bronchitis. Albuterol Sulfate  (90 Base) MCG/ACT Inhalation Aero Soln 1 Inhaler 0 9/16/2019 10/16/2019   Sig:   Inhale 2 puffs into the lungs every 6 (six) hours as needed for Wheezing.      Route:   Inhalation       Rx pend

## 2019-10-07 NOTE — TELEPHONE ENCOUNTER
Albuterol Sulfate  (90 Base) MCG/ACT Inhalation Aero Soln   Pt only has 1 dosage left,   Pt is recovering from pneumonia, breating treatments are helping. Send to [facility]. Can pt be called when sent to the pharmacy.

## 2019-10-09 ENCOUNTER — TELEPHONE (OUTPATIENT)
Dept: FAMILY MEDICINE CLINIC | Facility: CLINIC | Age: 44
End: 2019-10-09

## 2019-10-09 RX ORDER — ALBUTEROL SULFATE 2.5 MG/3ML
2.5 SOLUTION RESPIRATORY (INHALATION) EVERY 4 HOURS PRN
Qty: 30 AMPULE | Refills: 0 | Status: SHIPPED | OUTPATIENT
Start: 2019-10-09 | End: 2020-01-30

## 2019-10-09 NOTE — TELEPHONE ENCOUNTER
Pt stated script filled was not what she requested. She need nebulizing treatment not inhaler. She does not feel inhaler is sufficient for her symptoms.

## 2019-10-09 NOTE — TELEPHONE ENCOUNTER
Patient states she got a refill of her inhaler today, but she was actually needing neb solution.   Med pended    Last refill 9/12/19 #30 0 refills  Last OV 9/16/19

## 2020-01-29 NOTE — TELEPHONE ENCOUNTER
Pt called stated she needs a refill on Imatrix and would like sent to [facility].

## 2020-01-29 NOTE — TELEPHONE ENCOUNTER
Pt scheduled a f/u appt for tomorrow. FYI pt states her insurance is changing March 1st and would like to hold off on doing any BW until after it changes.     Future Appointments   Date Time Provider Cheikh Evans   1/30/2020 11:40 AM Ramos Mack MD [facility]

## 2020-01-30 ENCOUNTER — OFFICE VISIT (OUTPATIENT)
Dept: FAMILY MEDICINE CLINIC | Facility: CLINIC | Age: 45
End: 2020-01-30
Payer: MEDICAID

## 2020-01-30 VITALS
SYSTOLIC BLOOD PRESSURE: 122 MMHG | RESPIRATION RATE: 17 BRPM | TEMPERATURE: 98 F | OXYGEN SATURATION: 98 % | DIASTOLIC BLOOD PRESSURE: 76 MMHG | BODY MASS INDEX: 45.59 KG/M2 | HEART RATE: 89 BPM | HEIGHT: 65.5 IN | WEIGHT: 277 LBS

## 2020-01-30 DIAGNOSIS — G43.001 MIGRAINE WITHOUT AURA AND WITH STATUS MIGRAINOSUS, NOT INTRACTABLE: Primary | ICD-10-CM

## 2020-01-30 PROCEDURE — 99214 OFFICE O/P EST MOD 30 MIN: CPT | Performed by: FAMILY MEDICINE

## 2020-01-30 RX ORDER — SUMATRIPTAN 50 MG/1
50 TABLET, FILM COATED ORAL EVERY 2 HOUR PRN
Qty: 10 TABLET | Refills: 0 | OUTPATIENT
Start: 2020-01-30

## 2020-01-30 RX ORDER — CYCLOBENZAPRINE HCL 10 MG
10 TABLET ORAL DAILY PRN
Qty: 30 TABLET | Refills: 0 | Status: SHIPPED | OUTPATIENT
Start: 2020-01-30

## 2020-01-30 RX ORDER — SUMATRIPTAN 100 MG/1
100 TABLET, FILM COATED ORAL DAILY PRN
Qty: 9 TABLET | Refills: 0 | Status: SHIPPED | OUTPATIENT
Start: 2020-01-30 | End: 2020-03-09

## 2020-01-30 NOTE — PROGRESS NOTES
Patient presents with: Follow - Up: migraine       HPI:    Patient ID: Myles Macario is a 45year old female. Has hx of migraine headaches.  Needs refills today  Dx: early 25s  Location: varies  Assoc with nausea occas  Triggers: season change, menstr Past Surgical History:   Procedure Laterality Date   • FRACTURE SURGERY  2017    right ankle   • REPAIR ING HERNIA,5+Y/O,REDUCIBL      as a child      No family history on file.    Social History: Social History    Tobacco Use      Smoking status: [employer] cyclobenzaprine 10 MG Oral Tab 30 tablet 0     Sig: Take 1 tablet (10 mg total) by mouth daily as needed for Muscle spasms.    • SUMAtriptan Succinate (IMITREX) 100 MG Oral Tab 9 tablet 0     Sig: Take 1 tablet (100 mg total) by mouth daily as needed for Mi

## 2020-03-09 RX ORDER — SUMATRIPTAN 100 MG/1
TABLET, FILM COATED ORAL
Qty: 9 TABLET | Refills: 0 | Status: SHIPPED | OUTPATIENT
Start: 2020-03-09

## 2020-03-09 NOTE — TELEPHONE ENCOUNTER
LOV 1/30/20 for migraine. SUMAtriptan Succinate (IMITREX) 100 MG Oral Tab 9 tablet 0 1/30/2020    Sig:   Take 1 tablet (100 mg total) by mouth daily as needed for Migraine. Route:   Oral       No future appointments.

## 2022-08-01 ENCOUNTER — OFFICE VISIT (OUTPATIENT)
Dept: FAMILY MEDICINE CLINIC | Facility: CLINIC | Age: 47
End: 2022-08-01
Payer: COMMERCIAL

## 2022-08-01 VITALS
BODY MASS INDEX: 43.67 KG/M2 | HEIGHT: 66.5 IN | OXYGEN SATURATION: 99 % | HEART RATE: 90 BPM | TEMPERATURE: 99 F | WEIGHT: 275 LBS | RESPIRATION RATE: 16 BRPM

## 2022-08-01 DIAGNOSIS — R09.81 NASAL CONGESTION: ICD-10-CM

## 2022-08-01 DIAGNOSIS — R05.1 ACUTE COUGH: Primary | ICD-10-CM

## 2022-08-01 PROCEDURE — 87637 SARSCOV2&INF A&B&RSV AMP PRB: CPT | Performed by: NURSE PRACTITIONER

## 2022-08-02 LAB
FLUAV + FLUBV RNA SPEC NAA+PROBE: NEGATIVE
FLUAV + FLUBV RNA SPEC NAA+PROBE: NEGATIVE
RSV RNA SPEC NAA+PROBE: NEGATIVE
SARS-COV-2 RNA RESP QL NAA+PROBE: NOT DETECTED

## 2023-09-06 ENCOUNTER — TELEPHONE (OUTPATIENT)
Dept: FAMILY MEDICINE CLINIC | Facility: CLINIC | Age: 48
End: 2023-09-06

## 2023-09-06 NOTE — TELEPHONE ENCOUNTER
LOV 1/30/20. Still a pt of DB? Please call pt. If she still wants to see DB please schedule an appt. Thanks! No future appointments.

## 2023-09-29 ENCOUNTER — PATIENT OUTREACH (OUTPATIENT)
Dept: CASE MANAGEMENT | Age: 48
End: 2023-09-29

## 2023-09-29 NOTE — PROCEDURES
The office order for PCP removal request is Approved and finalized on September 29, 2023.     Thanks,  [facility] [employer]

## 2025-05-26 ENCOUNTER — HOSPITAL ENCOUNTER (OUTPATIENT)
Age: 50
Discharge: HOME | End: 2025-05-26
Payer: MEDICAID

## 2025-05-26 ENCOUNTER — APPOINTMENT (OUTPATIENT)
Dept: GENERAL RADIOLOGY | Age: 50
End: 2025-05-26
Attending: NURSE PRACTITIONER
Payer: MEDICAID

## 2025-05-26 ENCOUNTER — TELEPHONE (OUTPATIENT)
Dept: CASE MANAGEMENT | Facility: HOSPITAL | Age: 50
End: 2025-05-26

## 2025-05-26 VITALS
SYSTOLIC BLOOD PRESSURE: 157 MMHG | HEART RATE: 82 BPM | OXYGEN SATURATION: 100 % | TEMPERATURE: 98 F | DIASTOLIC BLOOD PRESSURE: 97 MMHG | RESPIRATION RATE: 18 BRPM

## 2025-05-26 DIAGNOSIS — B97.89 VIRAL RESPIRATORY ILLNESS: Primary | ICD-10-CM

## 2025-05-26 DIAGNOSIS — J98.8 VIRAL RESPIRATORY ILLNESS: Primary | ICD-10-CM

## 2025-05-26 DIAGNOSIS — J98.01 BRONCHOSPASM: ICD-10-CM

## 2025-05-26 DIAGNOSIS — R03.0 ELEVATED BLOOD PRESSURE READING: ICD-10-CM

## 2025-05-26 PROCEDURE — 99214 OFFICE O/P EST MOD 30 MIN: CPT | Performed by: NURSE PRACTITIONER

## 2025-05-26 PROCEDURE — 71046 X-RAY EXAM CHEST 2 VIEWS: CPT | Performed by: NURSE PRACTITIONER

## 2025-05-26 RX ORDER — ALBUTEROL SULFATE 90 UG/1
2 INHALANT RESPIRATORY (INHALATION) EVERY 4 HOURS PRN
Qty: 1 EACH | Refills: 0 | Status: SHIPPED | OUTPATIENT
Start: 2025-05-26 | End: 2025-06-25

## 2025-05-26 RX ORDER — BENZONATATE 100 MG/1
100 CAPSULE ORAL 3 TIMES DAILY PRN
Qty: 20 CAPSULE | Refills: 0 | Status: SHIPPED | OUTPATIENT
Start: 2025-05-26 | End: 2025-06-02

## 2025-05-26 RX ORDER — ALBUTEROL SULFATE 90 UG/1
INHALANT RESPIRATORY (INHALATION) EVERY 6 HOURS PRN
COMMUNITY

## 2025-05-26 RX ORDER — PSEUDOEPHEDRINE HCL 30 MG/1
30 TABLET, FILM COATED ORAL EVERY 4 HOURS PRN
COMMUNITY

## 2025-05-26 RX ORDER — BUTALBITAL, ACETAMINOPHEN AND CAFFEINE 50; 325; 40 MG/1; MG/1; MG/1
1 TABLET ORAL EVERY 4 HOURS PRN
COMMUNITY

## 2025-05-26 RX ORDER — PREDNISONE 20 MG/1
40 TABLET ORAL DAILY
Qty: 10 TABLET | Refills: 0 | Status: SHIPPED | OUTPATIENT
Start: 2025-05-26 | End: 2025-05-31

## 2025-05-26 NOTE — ED PROVIDER NOTES
Patient Seen in: [facility]    History   CC: cough  HPI: Lisbeth Tovar 50 year old female  who presents c/o cough, congestion, fatigue x8 days, worsened in the last 4 days. Denies fever, rash, gi s/s, cp, hemoptysis    Past Medical History[1]    Past Surgical History[2]    Family History[3]    Short Social Hx on File[4]    ROS:  Systems reviewed: All pertinent positives noted in HPI. Unless otherwise noted, additional systems reviewed are negative.   Vital signs reviewed.    Positive for stated complaint: Cough, Congestion  Other systems are as noted in HPI.  Constitutional and vital signs reviewed.      All other systems reviewed and negative except as noted above.    PSFH elements reviewed from today and agreed except as otherwise stated in HPI.             Constitutional and vital signs reviewed.        Physical Exam     ED Triage Vitals [05/26/25 1330]   BP (!) 157/97   Pulse 82   Resp 18   Temp 98.3 °F (36.8 °C)   Temp src Oral   SpO2 100 %   O2 Device None (Room air)       Current:BP (!) 157/97   Pulse 82   Temp 98.3 °F (36.8 °C) (Oral)   Resp 18   LMP 05/06/2025 (Exact Date)   SpO2 100%         PE:  General - Appears well, non-toxic and in NAD  Head - Appears symmetrical without deformity/swelling cranium, scalp, or facial bones  Eyes - sclera not injected, no discharge noted, no periorbital edema  ENT - EAC bilaterally without discharge, TM pearly grey with COL visualized appropriately bilaterally.   no nasal drainage noted in nares bilat, no cobblestoning to post. Pharynx.   Oropharynx clear, posterior pharynx is without erythema and without tonsilar enlargement or exudate, uvula midline, +gag, voice is clear. No trismus  Neck - no significant adenopathy, supple with trachea midline  Resp - Lung sounds clear bilaterally and wob unlabored, good aeration with equal, even expansion bilaterally   CV - RRR  Skin - no rashes or petechiae noted, pink warm and dry throughout, mmm, cap refill  <2seconds  Neuro - A&O x4, steady gait  MSK - makes purposeful movements of all extremities, radial pulses 2+ bilat.  Psych - Interactive and appropriate      ED Course   Labs Reviewed - No data to display    MDM     XR CHEST PA + LAT CHEST (CPT=71046)   Final Result   PROCEDURE:  XR CHEST PA + LAT CHEST (CPT=71046)       INDICATIONS:  Cough, Congestion       COMPARISON:  [facility], XR, XR CHEST PA + LAT CHEST    (CPT=71046), 9/12/2019, 1:48 PM.  [facility], XR, XR    CHEST PA + LAT CHEST (CUN=49899), 9/04/2019, 3:44 PM.       TECHNIQUE:  PA and lateral chest radiographs were obtained.       PATIENT STATED HISTORY: (As transcribed by Technologist)  Patient with    cough and congestion, fever for 8-9 days.            FINDINGS:     Cardiac size and pulmonary vasculature are within normal limits. No    pleural effusions. No pneumothorax.                          =====   CONCLUSION:  No acute pulmonary findings.           LOCATION:  [facility]           Dictated by (CST): Radha Benson MD on 5/26/2025 at 2:13 PM        Finalized by (CST): Radha Benson MD on 5/26/2025 at 2:14 PM             DDx: PNA, influenza, COVID-19, unspecified viral illness, bronchospasm    Chest x-ray as noted above without acute process.  Independently reviewed by this provider and results discussed with patient and her  who is also present.  Offered COVID and flu testing which patient declines.  General viral illness instructions reviewed, rest, hydration instructions, Tylenol or Motrin as needed for discomfort, cough suppressant as prescribed, inhaler and prednisone as prescribed, precautions on use, follow-up and return/ED precautions reviewed. Patient is historian and demonstrates understanding of all instruction and agrees with plan of care.    Patient's blood pressure elevated at immediate care.  Nonsymptomatic of high blood pressure. Neuro intact. Red flags reviewed with patient as well as  close follow-up instructions. Advised bp checks at home with log for f/u PCP visit.  Pt demonstrates understanding of instruction and agrees with plan of care.    Disposition and Plan     Clinical Impression:  1. Viral respiratory illness    2. Bronchospasm    3. Elevated blood pressure reading        Disposition:  Discharge    Follow-up:  Yamil Villafana MD  [facility] [place] [id number]  [phone]    Go in 1 week  As needed      Medications Prescribed:  Current Discharge Medication List        START taking these medications    Details   benzonatate 100 MG Oral Cap Take 1 capsule (100 mg total) by mouth 3 (three) times daily as needed for cough.  Qty: 20 capsule, Refills: 0      predniSONE 20 MG Oral Tab Take 2 tablets (40 mg total) by mouth daily for 5 days.  Qty: 10 tablet, Refills: 0      !! albuterol 108 (90 Base) MCG/ACT Inhalation Aero Soln Inhale 2 puffs into the lungs every 4 (four) hours as needed for Wheezing or Shortness of Breath (spastic cough).  Qty: 1 each, Refills: 0       !! - Potential duplicate medications found. Please discuss with provider.                         [1]   Past Medical History:   Asthma (HCC)    Fibromyalgia    IBS (irritable bowel syndrome)    Migraines    Pneumonia   [2]   Past Surgical History:  Procedure Laterality Date    Fracture surgery  2017    right ankle    Repair ing hernia,7+y/o,reducibl      as a child   [3] No family history on file.  [4]   Social History  Socioeconomic History    Marital status:    Tobacco Use    Smoking status: Never    Smokeless tobacco: Never   Vaping Use    Vaping status: Never Used   Substance and Sexual Activity    Alcohol use: Yes    Drug use: Never

## 2025-05-26 NOTE — CM/SW NOTE
Pt  called stating that pharmacy did not have the RX for Benzonatate. CM received verbal consent from patient to discuss care with her . Pt did not receive the benzonatate printed RX as listed below. CM called in the RX for Benzonatate to [facility] in [place] [phone]. CM spoke with the Pharmacist, Shirley. CM updated pt that RX was called in.

## 2025-06-10 ENCOUNTER — OFFICE VISIT (OUTPATIENT)
Dept: FAMILY MEDICINE CLINIC | Facility: CLINIC | Age: 50
End: 2025-06-10
Payer: COMMERCIAL

## 2025-06-10 VITALS
SYSTOLIC BLOOD PRESSURE: 136 MMHG | HEIGHT: 67 IN | RESPIRATION RATE: 21 BRPM | WEIGHT: 275.63 LBS | HEART RATE: 78 BPM | DIASTOLIC BLOOD PRESSURE: 70 MMHG | TEMPERATURE: 98 F | BODY MASS INDEX: 43.26 KG/M2 | OXYGEN SATURATION: 99 %

## 2025-06-10 DIAGNOSIS — R35.0 URINARY FREQUENCY: Primary | ICD-10-CM

## 2025-06-10 DIAGNOSIS — Z20.818 EXPOSURE TO STREP THROAT: ICD-10-CM

## 2025-06-10 DIAGNOSIS — B34.9 VIRAL SYNDROME: ICD-10-CM

## 2025-06-10 LAB
APPEARANCE: CLEAR
BILIRUBIN: NEGATIVE
CONTROL LINE PRESENT WITH A CLEAR BACKGROUND (YES/NO): YES YES/NO
GLUCOSE (URINE DIPSTICK): NEGATIVE MG/DL
KETONES (URINE DIPSTICK): NEGATIVE MG/DL
KIT LOT #: NORMAL NUMERIC
LEUKOCYTES: NEGATIVE
MULTISTIX LOT#: ABNORMAL NUMERIC
NITRITE, URINE: NEGATIVE
PH, URINE: 5.5 (ref 4.5–8)
PROTEIN (URINE DIPSTICK): NEGATIVE MG/DL
SPECIFIC GRAVITY: 1.02 (ref 1–1.03)
STREP GRP A CUL-SCR: NEGATIVE
URINE-COLOR: YELLOW
UROBILINOGEN,SEMI-QN: 0.2 MG/DL (ref 0–1.9)

## 2025-06-10 PROCEDURE — 87880 STREP A ASSAY W/OPTIC: CPT | Performed by: NURSE PRACTITIONER

## 2025-06-10 PROCEDURE — 87086 URINE CULTURE/COLONY COUNT: CPT | Performed by: NURSE PRACTITIONER

## 2025-06-10 PROCEDURE — 81003 URINALYSIS AUTO W/O SCOPE: CPT | Performed by: NURSE PRACTITIONER

## 2025-06-10 PROCEDURE — 87637 SARSCOV2&INF A&B&RSV AMP PRB: CPT | Performed by: NURSE PRACTITIONER

## 2025-06-10 PROCEDURE — 99213 OFFICE O/P EST LOW 20 MIN: CPT | Performed by: NURSE PRACTITIONER

## 2025-06-10 NOTE — PATIENT INSTRUCTIONS
1. Rest.   2. Drink plenty of fluids.  3. [place] diet. Advance as tolerated.  4. Supportive care as discussed.   5. The rapid strep test is negative. Covid-19/FLU/RSV testing sent to lab.  Self quarantine at this time. If covid-19 test is positive then please follow the listed guidelines below:  Home isolation until:  Your symptoms are improving AND  You are fever free for 24 hours without using fever reducing medications  Resume normal activities, and use added prevention strategies over the next five days.  Clean your hands often  wear a well-fitting mask when around others  keep a distance from others    6. We will send urine culture to lab and call you with results in 2-3 days.    7. Follow up with PMD in 4-5 days for re-eval. Go to the emergency department immediately if symptoms worsen, change, you develop abdominal pain, vomiting, pelvic pain, severe back pain, chest discomfort, wheezing, shortness of breath, or if you have any concerns.

## 2025-06-10 NOTE — PROGRESS NOTES
CHIEF COMPLAINT:     Chief Complaint   Patient presents with    Urinary Symptoms       HPI:   Lisbeth Tovar is a 50 year old female who presents for a few complaints. Patient reports nasal symptoms, fatigue, diarrhea, and occasional cough for the past 3 days. Notes 4 episodes of diarrhea on Sunday and 2 overnight. Denies any blood in stool or abdpminal pain.    Denies any fevers, wheezing, chest discomfort, or shortness of breath.   Tolerates PO well at home. No n/v. Denies any other aggravating or relieving factors at home. Denies any other treatment attempts prior to arrival.       She also presents with urinary frequency the past 3 days.  Denies dysuria, urgency, flank pain, fever, hematuria, nausea, or vomiting.  Denies abdominal pain, pelvic pain, vaginal discharge, bleeding, itching, or concerns for std's. Tolerates PO well at home. No n/v/d.  Denies any other aggravating or relieving factors at home. Denies any other treatment attempts prior to arrival.        Current Medications[1]   Past Medical History[2]   Past Surgical History[3]      Short Social Hx on File[4]      REVIEW OF SYSTEMS:   GENERAL: Denies fever. Notes good appetite  SKIN: no rashes or abnormal skin lesions  HEENT: Denies sore throat, + sinus symptoms, Denies ear pain  LUNGS: + occasional nonproductive cough, denies shortness of breath or wheezing,   CARDIOVASCULAR: denies chest pain or palpitations   GI: + diarrhea. Denies blood in stool.denies N/V/C or abdominal pain  : + urinary frequency. Denies dysuria, hematuria, flank pain, or vaginal bleeding/discharge.  NEURO: Denies headaches    EXAM:   /70 (BP Location: Right arm, Patient Position: Sitting, Cuff Size: large)   Pulse 78   Temp 98.2 °F (36.8 °C) (Oral)   Resp 21   Ht 5' 7\" (1.702 m)   Wt 275 lb 9.6 oz (125 kg)   LMP 05/31/2025 (Exact Date)   SpO2 99%   BMI 43.17 kg/m²   GENERAL: well developed, well nourished,in no apparent distress  SKIN: no rashes,no suspicious  lesions  HEAD: atraumatic, normocephalic.    EYES: conjunctiva clear  EARS: TM's intact and without erythema, no bulging, no retraction,no fluid, bony landmarks visualized. No erythema or swelling noted to ear canals or external ears.   NOSE: Nostrils patent, clear nasal mucous,nasal mucosa reddened   THROAT: Oral mucosa pink, moist. Posterior pharynx is not erythematous. No exudates. No tonsillar hypertrophy noted.  No trismus. Uvula midline with no swelling. Voice clear/normal. No stridor  NECK: Supple, non-tender  LUNGS: clear to auscultation bilaterally, no rales, wheezes or rhonchi. Breathing is non labored.  CARDIO: RRR without murmur  GI: soft, non distended. No visible peristalsis. No masses. No organomegaly. No tenderness in any quadrant. Bowel sounds: present. Guarding : none. Rigidity: none.   : No suprapubic tenderness. No bladder distention, or CVAT   EXTREMITIES: no cyanosis, clubbing or edema  LYMPH:  No lymphadenopathy.      ASSESSMENT AND PLAN:       ICD-10-CM    1. Urinary frequency  R35.0 Urine Dip, auto without Micro     Urine Culture, Routine     Urine Culture, Routine      2. Viral syndrome  B34.9 SARS-CoV-2/Flu A and B/RSV by PCR ([facility]) [E]     SARS-CoV-2/Flu A and B/RSV by PCR ([facility]) [E]     CANCELED: Grp A Strep Cult, Throat      3. Exposure to strep throat  Z20.818 Rapid Strep            Viral Syndrome/URI/Exposure to Strep:     Viral panel testing sent to lab.    Rapid strep negative.    Discussed physical exam and hpi with pt. No bacterial focus noted on exam. Pt has reassuring physical exam consistent with mild viral uri symptoms. Lungs clear bilat. No respiratory distress noted. No signs of pta or retropharyngeal infection. Pt reports no vomiting and tolerates PO fluids well. No signs of peritonitis/acute abdmen or dehydration. We discussed covid-19 vs other etiologies.. Treatment options discussed with patient and explained in detail. We reviewed symptomatic care at home. The  risks, benefits and potential side effects of possible medications were reviewed. Alternatives were discussed. Monitoring parameters and expected course outlined. Patient to call PCP or go to emergency department if symptoms fail to respond as outlined, or worsen in any way. The patient agreed with the plan.    Urinary Frequency:    Urine dip: + trace blood, negative for leuks, nitrites  Urine culture sent to lab.    Discussed HPI and physical exam with pt. We discussed unclear etiology of her urinary frequency. Discussed uti vs other etiologies. Urine dip not highly suggestive of uti at this time but will send urine culture.  Treatment options discussed with patient and explained in detail. We reviewed symptomatic care at home pending urine culture result.The risks, benefits and potential side effects of the medications were reviewed. Alternatives were discussed. Monitoring parameters and expected course outlined. We discussed signs and symptoms that should prompt her to go to the emergency department immediately for evaluation including any fevers, flank pain, abdominal pain, vomiting, vaginal bleeding or if she has any concerns. Patient to call PCP or go to emergency department if symptoms fail to respond as outlined, or worsen in any way. The patient agreed with the plan.       Patient Instructions   1. Rest.   2. Drink plenty of fluids.  3. [place] diet. Advance as tolerated.  4. Supportive care as discussed.   5. The rapid strep test is negative. Covid-19/FLU/RSV testing sent to lab.  Self quarantine at this time. If covid-19 test is positive then please follow the listed guidelines below:  Home isolation until:  Your symptoms are improving AND  You are fever free for 24 hours without using fever reducing medications  Resume normal activities, and use added prevention strategies over the next five days.  Clean your hands often  wear a well-fitting mask when around others  keep a distance from others    6. We  will send urine culture to lab and call you with results in 2-3 days.    7. Follow up with PMD in 4-5 days for re-eval. Go to the emergency department immediately if symptoms worsen, change, you develop abdominal pain, vomiting, pelvic pain, severe back pain, chest discomfort, wheezing, shortness of breath, or if you have any concerns.                   [1]   Current Outpatient Medications   Medication Sig Dispense Refill    pseudoephedrine 30 MG Oral Tab Take 1 tablet (30 mg total) by mouth every 4 (four) hours as needed for congestion. Yesterday at 1500      butalbital-acetaminophen-caffeine -40 MG Oral Tab Take 1 tablet by mouth every 4 (four) hours as needed for Pain.      albuterol 108 (90 Base) MCG/ACT Inhalation Aero Soln Inhale into the lungs every 6 (six) hours as needed for Wheezing.      albuterol 108 (90 Base) MCG/ACT Inhalation Aero Soln Inhale 2 puffs into the lungs every 4 (four) hours as needed for Wheezing or Shortness of Breath (spastic cough). 1 each 0    SUMATRIPTAN SUCCINATE 100 MG Oral Tab TAKE 1 TABLET BY MOUTH ONE TIME A DAY AS NEEDED FOR MIGRAINE 9 tablet 0    cyclobenzaprine 10 MG Oral Tab Take 1 tablet (10 mg total) by mouth daily as needed for Muscle spasms. 30 tablet 0    Galcanezumab-gnlm (EMGALITY SC) Inject into the skin. (Patient not taking: Reported on 5/26/2025)      magnesium 250 MG Oral Tab Take by mouth.      ibuprofen 800 MG Oral Tab       gabapentin (NEURONTIN) 100 MG Oral Cap Take 1 capsule (100 mg total) by mouth 3 (three) times daily. 90 capsule 0   [2]   Past Medical History:   Asthma (HCC)    Fibromyalgia    IBS (irritable bowel syndrome)    Migraines    Pneumonia   [3]   Past Surgical History:  Procedure Laterality Date    Fracture surgery  2017    right ankle    Repair ing hernia,7+y/o,reducibl      as a child   [4]   Social History  Socioeconomic History    Marital status:    Tobacco Use    Smoking status: Never    Smokeless tobacco: Never   Vaping Use     Vaping status: Never Used   Substance and Sexual Activity    Alcohol use: Yes    Drug use: Never

## 2025-06-11 LAB
FLUAV + FLUBV RNA SPEC NAA+PROBE: NOT DETECTED
FLUAV + FLUBV RNA SPEC NAA+PROBE: NOT DETECTED
RSV RNA SPEC NAA+PROBE: NOT DETECTED
SARS-COV-2 RNA RESP QL NAA+PROBE: NOT DETECTED

## (undated) NOTE — LETTER
[address] [place] [address]  Dept: [phone]  Dept Fax: [phone]         September 12, 2019    Patient: Lucas Lackey   YOB: 1975   Date of Visit: 9/12/2019       To Whom It May Concern: